# Patient Record
Sex: FEMALE | Race: OTHER | Employment: UNEMPLOYED | URBAN - METROPOLITAN AREA
[De-identification: names, ages, dates, MRNs, and addresses within clinical notes are randomized per-mention and may not be internally consistent; named-entity substitution may affect disease eponyms.]

---

## 2017-06-14 ENCOUNTER — APPOINTMENT (OUTPATIENT)
Dept: GENERAL RADIOLOGY | Age: 32
End: 2017-06-14
Attending: PHYSICIAN ASSISTANT
Payer: OTHER MISCELLANEOUS

## 2017-06-14 ENCOUNTER — HOSPITAL ENCOUNTER (EMERGENCY)
Age: 32
Discharge: HOME | End: 2017-06-14
Attending: EMERGENCY MEDICINE | Admitting: EMERGENCY MEDICINE
Payer: OTHER MISCELLANEOUS

## 2017-06-14 ENCOUNTER — APPOINTMENT (OUTPATIENT)
Dept: CT IMAGING | Age: 32
End: 2017-06-14
Attending: PHYSICIAN ASSISTANT
Payer: OTHER MISCELLANEOUS

## 2017-06-14 VITALS
RESPIRATION RATE: 16 BRPM | TEMPERATURE: 97.7 F | DIASTOLIC BLOOD PRESSURE: 71 MMHG | BODY MASS INDEX: 27.21 KG/M2 | SYSTOLIC BLOOD PRESSURE: 128 MMHG | HEIGHT: 59 IN | OXYGEN SATURATION: 100 % | WEIGHT: 135 LBS | HEART RATE: 68 BPM

## 2017-06-14 DIAGNOSIS — S09.90XA CHI (CLOSED HEAD INJURY), INITIAL ENCOUNTER: Primary | ICD-10-CM

## 2017-06-14 DIAGNOSIS — S39.012A BACK STRAIN, INITIAL ENCOUNTER: ICD-10-CM

## 2017-06-14 LAB
APPEARANCE UR: ABNORMAL
BACTERIA URNS QL MICRO: ABNORMAL /HPF
BILIRUB UR QL: NEGATIVE
COLOR UR: ABNORMAL
EPITH CASTS URNS QL MICRO: ABNORMAL /LPF
GLUCOSE UR STRIP.AUTO-MCNC: NEGATIVE MG/DL
HCG UR QL: NEGATIVE
HGB UR QL STRIP: ABNORMAL
HYALINE CASTS URNS QL MICRO: ABNORMAL /LPF (ref 0–5)
KETONES UR QL STRIP.AUTO: NEGATIVE MG/DL
LEUKOCYTE ESTERASE UR QL STRIP.AUTO: ABNORMAL
NITRITE UR QL STRIP.AUTO: NEGATIVE
PH UR STRIP: 7.5 [PH] (ref 5–8)
PROT UR STRIP-MCNC: NEGATIVE MG/DL
RBC #/AREA URNS HPF: ABNORMAL /HPF (ref 0–5)
SP GR UR REFRACTOMETRY: 1.01 (ref 1–1.03)
UROBILINOGEN UR QL STRIP.AUTO: 0.2 EU/DL (ref 0.2–1)
WBC URNS QL MICRO: ABNORMAL /HPF (ref 0–4)

## 2017-06-14 PROCEDURE — 81001 URINALYSIS AUTO W/SCOPE: CPT | Performed by: PHYSICIAN ASSISTANT

## 2017-06-14 PROCEDURE — 74011250637 HC RX REV CODE- 250/637: Performed by: PHYSICIAN ASSISTANT

## 2017-06-14 PROCEDURE — 72100 X-RAY EXAM L-S SPINE 2/3 VWS: CPT

## 2017-06-14 PROCEDURE — 81025 URINE PREGNANCY TEST: CPT

## 2017-06-14 PROCEDURE — 70450 CT HEAD/BRAIN W/O DYE: CPT

## 2017-06-14 PROCEDURE — 72070 X-RAY EXAM THORAC SPINE 2VWS: CPT

## 2017-06-14 PROCEDURE — 72050 X-RAY EXAM NECK SPINE 4/5VWS: CPT

## 2017-06-14 PROCEDURE — 99284 EMERGENCY DEPT VISIT MOD MDM: CPT

## 2017-06-14 RX ORDER — IBUPROFEN 600 MG/1
600 TABLET ORAL
Status: COMPLETED | OUTPATIENT
Start: 2017-06-14 | End: 2017-06-14

## 2017-06-14 RX ORDER — CYCLOBENZAPRINE HCL 10 MG
10 TABLET ORAL
Status: COMPLETED | OUTPATIENT
Start: 2017-06-14 | End: 2017-06-14

## 2017-06-14 RX ORDER — IBUPROFEN 200 MG
TABLET ORAL
COMMUNITY

## 2017-06-14 RX ORDER — CYCLOBENZAPRINE HCL 10 MG
10 TABLET ORAL
Qty: 10 TAB | Refills: 0 | Status: SHIPPED | OUTPATIENT
Start: 2017-06-14

## 2017-06-14 RX ADMIN — CYCLOBENZAPRINE HYDROCHLORIDE 10 MG: 10 TABLET, FILM COATED ORAL at 19:17

## 2017-06-14 RX ADMIN — IBUPROFEN 600 MG: 600 TABLET, FILM COATED ORAL at 19:17

## 2017-06-14 NOTE — LETTER
[address] 
[facility] EMERGENCY DEPT 
[address] [facility] [address]rielle Lynch [address] [id number] [phone] Work/School Note Date: 6/14/2017 To Whom It May concern: 
 
Coty Goldman was seen and treated today in the emergency room by the following provider(s): 
Attending Provider: Ankur Dietz MD 
Physician Assistant: Bonita You PA-C. Please excuse Ms. Shahnaz To from work 6/15/17 and 6/16/17. Coty Goldman may return to work on 6/17/17. Sincerely, Bonita You PA-C

## 2017-06-14 NOTE — ED NOTES
Bedside and Verbal shift change report given to [address] [place] [address] Rd (oncoming nurse) by Karen Matthews RN (offgoing nurse). Report included the following information SBAR, ED Summary and MAR.

## 2017-06-14 NOTE — ED TRIAGE NOTES
Was at work on Monday and a door fell, hitting her in the head. No LOC. Was seen at ER in [place] and discharge. Presents today with c/o lower back pain. No loss of b/b control. Ambulatory.

## 2017-06-15 NOTE — DISCHARGE INSTRUCTIONS
We hope that we have addressed all of your medical concerns. The examination and treatment you received in the Emergency Department were for an emergent problem and were not intended as complete care. It is important that you follow up with your healthcare provider(s) for ongoing care. If your symptoms worsen or do not improve as expected, and you are unable to reach your usual health care provider(s), you should return to the Emergency Department. Today's healthcare is undergoing tremendous change, and patient satisfaction surveys are one of the many tools to assess the quality of medical care. You may receive a survey from the [employer] regarding your experience in the Emergency Department. I hope that your experience has been completely positive, particularly the medical care that I provided. As such, please participate in the survey; anything less than excellent does not meet my expectations or intentions. [facility] and [address] [place] [address] participate in nationally recognized quality of care measures. If your blood pressure is greater than 120/80, as reported below, we urge that you seek medical care to address the potential of high blood pressure, commonly known as hypertension. Hypertension can be hereditary or can be caused by certain medical conditions, pain, stress, or \"white coat syndrome. \"       Please make an appointment with your health care provider(s) for follow up of your Emergency Department visit. VITALS:   Patient Vitals for the past 8 hrs:   Temp Pulse Resp BP SpO2   06/14/17 1820 97.7 °F (36.5 °C) 68 16 128/71 100 %          Thank you for allowing us to provide you with medical care today. We realize that you have many choices for your emergency care needs. Please choose us in the future for any continued health care needs.       Regards,           Lobito Dao PA-C    [employer]. Office: [phone]            Recent Results (from the past 24 hour(s))   HCG URINE, QL. - POC    Collection Time: 06/14/17  7:46 PM   Result Value Ref Range    Pregnancy test,urine (POC) NEGATIVE  NEG     URINALYSIS W/MICROSCOPIC    Collection Time: 06/14/17  8:26 PM   Result Value Ref Range    Color YELLOW/STRAW      Appearance CLOUDY (A) CLEAR      Specific gravity 1.015 1.003 - 1.030      pH (UA) 7.5 5.0 - 8.0      Protein NEGATIVE  NEG mg/dL    Glucose NEGATIVE  NEG mg/dL    Ketone NEGATIVE  NEG mg/dL    Bilirubin NEGATIVE  NEG      Blood SMALL (A) NEG      Urobilinogen 0.2 0.2 - 1.0 EU/dL    Nitrites NEGATIVE  NEG      Leukocyte Esterase TRACE (A) NEG      WBC 0-4 0 - 4 /hpf    RBC 0-5 0 - 5 /hpf    Epithelial cells FEW FEW /lpf    Bacteria 1+ (A) NEG /hpf    Hyaline cast 0-2 0 - 5 /lpf       Xr Spine Cerv 4 Or 5 V    Result Date: 6/14/2017  Cervical spine, 6 views. 6/14/2017 8:30 PM INDICATION: Neck Pain COMPARISON: None. FINDINGS: Frontal, lateral, swimmer's, open-mouth, and bilateral oblique radiographs of the cervical spine. Cervical alignment and vertebral body heights are normal. The prevertebral soft tissues are normal. The facets are normally indicated. The spinolaminar line is intact. No significant degenerative disease. IMPRESSION: Normal alignment and vertebral body heights. Xr Spine Thorac 2 V    Result Date: 6/14/2017  THORACIC SPINE, 2 VIEWS. 6/14/2017 8:30 PM INDICATION: Back Pain. COMPARISON: None. FINDINGS: Frontal and lateral radiographs of the thoracic spine. The cervicothoracic junction is obscured on lateral radiograph. Thoracic alignment and vertebral body heights are normal.     IMPRESSION: Normal thoracic alignment and vertebral body heights. Xr Spine Lumb 2 Or 3 V    Result Date: 6/14/2017  EXAM:  XR SPINE LUMB 2 OR 3 V INDICATION:   pain, fall COMPARISON: None. FINDINGS: AP, lateral and spot lateral views of the lumbar spine demonstrate normal alignment.   The vertebral body heights and disc spaces are well-preserved. There is no fracture, subluxation or other abnormality. IMPRESSION:  No acute abnormality     Ct Head Wo Cont    Result Date: 6/14/2017  HEAD CT WITHOUT CONTRAST: 6/14/2017 8:27 PM INDICATION: head injury, vomiting, pain. HISTORY (per electronic medical record): A door fell on her head at work 2 days ago. No loss of consciousness. Single vomiting episode. COMPARISON: None. PROCEDURE: Axial images of the head were obtained without contrast. Coronal and sagittal reformats were performed. CT dose reduction was achieved through use of a standardized protocol tailored for this examination and automatic exposure control for dose modulation. FINDINGS: The ventricles and sulci are appropriate in size and configuration for age. No loss of gray-white differentiation to suggest late acute or early subacute infarction. No mass effect or intracranial hemorrhage. IMPRESSION: Normal.             Distensión de la espalda: Instrucciones de cuidado - [ Back Strain: Care Instructions ]  Instrucciones de cuidado    La distensión de la espalda ocurre cuando usted estira demasiado, o fuerza, un músculo de la espalda. Usted puede lesionarse la espalda en un accidente o cuando hace ejercicio o levanta algo. La mayoría de los marlin de espalda mejoran con reposo y [place]. Usted puede cuidarse en el hogar para ayudar a que berry espalda sane. La atención de seguimiento es alvarez parte clave de berry tratamiento y seguridad. Asegúrese de hacer y acudir a todas las citas, y llame a berry médico si está teniendo problemas. También es alvarez buena idea saber los resultados de los exámenes y mantener alvarez lista de los medicamentos que shimon. ¿Cómo puede cuidarse en el hogar? · Trate de permanecer tan activo katie pueda, rohini deje de hacer o reduzca cualquier actividad que le produzca dolor.   · Colóquese hielo o alvarez compresa fría en el músculo adolorido de 10 a 20 minutos cada vez para detener la hinchazón. Trate de hacerlo cada 1 o 2 horas shaka 3 días (cuando esté despierto) o hasta que la hinchazón baje. Póngase un paño roper entre el hielo y la piel. · Después de 2 o 3 días, coloque alvarez almohadilla térmica ajustada a baja temperatura o un paño tibio sobre la espalda. Algunos médicos sugieren que se alterne entre tratamientos calientes y fríos. · [employer] analgésicos (medicamentos para el dolor) exactamente según las indicaciones. ¨ Si el médico le recetó un analgésico, tómelo según las indicaciones. ¨ Si no está tomando un analgésico recetado, pregúntele a berry médico si puede gucci leighann de [employer]. · Trate de dormir de lado con alvarez almohada entre las piernas. O, colóquese alvarez almohada debajo de las rodillas cuando se acueste [place] Estas medidas pueden aliviar el dolor en la parte baja de la espalda. · Fortino Shah a poco a berry nivel habitual de actividades. ¿Cuándo debe pedir ayuda? Llame al 911 en cualquier momento que considere que necesita atención de [place]. Por ejemplo, llame si:  · Es completamente incapaz de  alvarez pierna. Llame a ebrry médico ahora mismo o busque atención médica inmediata si:  · Tiene síntomas nuevos o peores en las piernas, el abdomen o las nalgas (glúteos). Los síntomas pueden incluir:  ¨ Entumecimiento u hormigueo. ¨ Debilidad. ¨ Dolor. · Pierde el control de la vejiga o del intestino. Preste especial atención a los cambios en berry renetta y asegúrese de comunicarse con berry médico si no mejora katie se esperaba. ¿Dónde puede encontrar más información en inglés? Ashleigh Green a http://marito-felice.info/. Miranda Casillas W560 en la búsqueda para aprender más acerca de \"Distensión de la espalda: Instrucciones de cuidado - [ Back Strain: Care Instructions ]. \"  Revisado: 23 vaughn, 2016  Versión del contenido: 11.2  © 8305-6110 [employer].  Las instrucciones de cuidado fueron adaptadas bajo licencia por [employer] (which disclaims liability or warranty for this information). Si usted tiene [place] afección médica o sobre estas instrucciones, siempre pregunte a berry profesional de renetta. [facility] [employer] niega toda garantía o responsabilidad por berry uso de esta información. Aprenda sobre alvarez lesión cerrada en la tonie - [ [employer]to [employer] About a Closed Head Injury ]  ¿Qué es alvarez lesión cerrada en la tonie? Alvarez lesión cerrada en la tonie ocurre cuando la tonie recibe un golpe dre. La intensa fuerza del golpe hace que el cerebro se sacuda dentro del cráneo. Kristen movimiento puede hacer que el cerebro se magulle, se hinche o se desgarre. A veces, los nervios o los vasos sanguíneos también se dañan. Lihue puede provocar sangrado dentro del cerebro o alrededor de él. Tess Mitten conmoción cerebral es un tipo de lesión cerrada en la tonie. ¿Cuáles son los síntomas? Si usted tiene alvarez conmoción leve, podría tener un ligero dolor de tonie o sentirse \"no del todo brendan\". Estos síntomas son comunes. Suelen desaparecer entre unos pocos días y 4 semanas después. Dannie, a veces, es posible que después de alvarez conmoción cerebral usted se sienta katie si no pudiera funcionar luque brendan katie antes de la lesión. Y tiene nuevos síntomas. Lihue se llama síndrome posconmocional. Usted puede:  · Tener alvarez mayor dificultad para resolver problemas, pensar, concentrarse o recordar. · [employer] de [place]. · Tener cambios en kimmie patrones de sueño, katie no poder dormir o dormir todo el tiempo. · Tener cambios de personalidad. · No tener interés en las actividades habituales. · Sentirse enojado o ansioso sin motivo noah. · Perder el sentido del gusto o del olfato. · Estar mareado, aturdido o con dificultades para mantener el equilibrio. Podría resultarle difícil estar de pie o caminar. ¿Cómo se trata alvarez lesión cerrada en la tonie?   Cualquier persona que pueda tener alvarez conmoción cerebral necesita rosa m a un médico. Algunas personas tienen que permanecer en el hospital para estar en observación. Otras pueden volver a berry hogar de manera coyne. Si usted vuelve a berry hogar, siga las instrucciones de berry médico. Él o linda le dirá si necesita que alguien lo vigile atentamente shaka las siguientes 24 horas o más [place]. El descanso es el mejor tratamiento. Duerma lo suficiente por la noche. Y trate de descansar shaka el día. · Evite las actividades que requieran un esfuerzo físico o mental. Estas incluyen las tareas domésticas y escolares, así katie el ejercicio. Y evite jugar videojuegos, enviar mensajes de texto o usar la computadora. Es posible que deba cambiar berry horario escolar o laboral para poder evitar estas actividades. · Pregúntele a berry médico cuándo va a poder conducir, montar en bicicleta u operar maquinaria. · Onarga un analgésico (medicamento para el dolor) de venta romeo, katie acetaminofén (Tylenol), ibuprofeno (Advil, Motrin) o naproxeno (Aleve). Sea zak con los medicamentos. Beth y siga todas las instrucciones de la [place]. · Consulte a berry médico antes de gucci cualquier otro medicamento para el dolor. · No curt alcohol ni consuma drogas ilegales. Pueden retrasar la sanación. También pueden aumentar el riesgo de sufrir alvarez segunda lesión en la tonie. La atención de seguimiento es alvarez parte clave de berry tratamiento y seguridad. Asegúrese de hacer y acudir a todas las citas, y llame a berry médico si está teniendo problemas. También es alvarez buena idea saber los resultados de kimmie exámenes y mantener alvarez lista de los medicamentos que shimon. ¿Dónde puede encontrar más información en inglés? Dorothea Roque a http://marito-felice.info/. Escriba E235 en la búsqueda para aprender más acerca de \"Aprenda sobre alvarez lesión cerrada en la Raciel Bharathi - [ Learning About a Closed Head Injury ]. \"  Revisado: 14 octubre, 2016  Versión del contenido: 11.2  © 7145-4720 [employer].  Las instrucciones de cuidado fueron adaptadas bajo licencia por [facility] Connections (which disclaims liability or warranty for this information). Si usted tiene [place] afección médica o sobre estas instrucciones, siempre pregunte a berry profesional de renetta. [facility]orporated niega toda garantía o responsabilidad por berry uso de esta información.

## 2021-07-21 ENCOUNTER — HOSPITAL ENCOUNTER (EMERGENCY)
Age: 36
Discharge: HOME | End: 2021-07-21
Attending: EMERGENCY MEDICINE

## 2021-07-21 ENCOUNTER — APPOINTMENT (OUTPATIENT)
Dept: ULTRASOUND IMAGING | Age: 36
End: 2021-07-21
Attending: EMERGENCY MEDICINE

## 2021-07-21 ENCOUNTER — APPOINTMENT (OUTPATIENT)
Dept: GENERAL RADIOLOGY | Age: 36
End: 2021-07-21
Attending: PHYSICIAN ASSISTANT

## 2021-07-21 VITALS
TEMPERATURE: 99 F | WEIGHT: 132.94 LBS | SYSTOLIC BLOOD PRESSURE: 108 MMHG | RESPIRATION RATE: 14 BRPM | BODY MASS INDEX: 26.85 KG/M2 | DIASTOLIC BLOOD PRESSURE: 62 MMHG | OXYGEN SATURATION: 100 % | HEART RATE: 81 BPM

## 2021-07-21 DIAGNOSIS — R11.2 NAUSEA AND VOMITING, INTRACTABILITY OF VOMITING NOT SPECIFIED, UNSPECIFIED VOMITING TYPE: Primary | ICD-10-CM

## 2021-07-21 LAB
ALBUMIN SERPL-MCNC: 3.4 G/DL (ref 3.5–5)
ALBUMIN/GLOB SERPL: 0.8 {RATIO} (ref 1.1–2.2)
ALP SERPL-CCNC: 53 U/L (ref 45–117)
ALT SERPL-CCNC: 33 U/L (ref 12–78)
ANION GAP SERPL CALC-SCNC: 7 MMOL/L (ref 5–15)
APPEARANCE UR: CLEAR
AST SERPL-CCNC: 32 U/L (ref 15–37)
BACTERIA URNS QL MICRO: ABNORMAL /HPF
BASOPHILS # BLD: 0 K/UL (ref 0–0.1)
BASOPHILS NFR BLD: 0 % (ref 0–1)
BILIRUB SERPL-MCNC: 0.4 MG/DL (ref 0.2–1)
BILIRUB UR QL CFM: NEGATIVE
BUN SERPL-MCNC: 9 MG/DL (ref 6–20)
BUN/CREAT SERPL: 16 (ref 12–20)
CALCIUM SERPL-MCNC: 8.1 MG/DL (ref 8.5–10.1)
CHLORIDE SERPL-SCNC: 102 MMOL/L (ref 97–108)
CO2 SERPL-SCNC: 28 MMOL/L (ref 21–32)
COLOR UR: ABNORMAL
CREAT SERPL-MCNC: 0.58 MG/DL (ref 0.55–1.02)
DIFFERENTIAL METHOD BLD: ABNORMAL
EOSINOPHIL # BLD: 0 K/UL (ref 0–0.4)
EOSINOPHIL NFR BLD: 0 % (ref 0–7)
EPITH CASTS URNS QL MICRO: ABNORMAL /LPF
ERYTHROCYTE [DISTWIDTH] IN BLOOD BY AUTOMATED COUNT: 13.7 % (ref 11.5–14.5)
GLOBULIN SER CALC-MCNC: 4.4 G/DL (ref 2–4)
GLUCOSE SERPL-MCNC: 90 MG/DL (ref 65–100)
GLUCOSE UR STRIP.AUTO-MCNC: NEGATIVE MG/DL
HCG UR QL: NEGATIVE
HCT VFR BLD AUTO: 38.2 % (ref 35–47)
HGB BLD-MCNC: 12.6 G/DL (ref 11.5–16)
HGB UR QL STRIP: NEGATIVE
IMM GRANULOCYTES # BLD AUTO: 0.1 K/UL (ref 0–0.04)
IMM GRANULOCYTES NFR BLD AUTO: 1 % (ref 0–0.5)
KETONES UR QL STRIP.AUTO: 15 MG/DL
LEUKOCYTE ESTERASE UR QL STRIP.AUTO: ABNORMAL
LIPASE SERPL-CCNC: 170 U/L (ref 73–393)
LYMPHOCYTES # BLD: 0.6 K/UL (ref 0.8–3.5)
LYMPHOCYTES NFR BLD: 10 % (ref 12–49)
MCH RBC QN AUTO: 28.4 PG (ref 26–34)
MCHC RBC AUTO-ENTMCNC: 33 G/DL (ref 30–36.5)
MCV RBC AUTO: 86.2 FL (ref 80–99)
MONOCYTES # BLD: 0.4 K/UL (ref 0–1)
MONOCYTES NFR BLD: 7 % (ref 5–13)
NEUTS SEG # BLD: 4.8 K/UL (ref 1.8–8)
NEUTS SEG NFR BLD: 82 % (ref 32–75)
NITRITE UR QL STRIP.AUTO: NEGATIVE
NRBC # BLD: 0 K/UL (ref 0–0.01)
NRBC BLD-RTO: 0 PER 100 WBC
PH UR STRIP: 6 [PH] (ref 5–8)
PLATELET # BLD AUTO: 170 K/UL (ref 150–400)
PMV BLD AUTO: 11.5 FL (ref 8.9–12.9)
POTASSIUM SERPL-SCNC: 4.1 MMOL/L (ref 3.5–5.1)
PROT SERPL-MCNC: 7.8 G/DL (ref 6.4–8.2)
PROT UR STRIP-MCNC: NEGATIVE MG/DL
RBC # BLD AUTO: 4.43 M/UL (ref 3.8–5.2)
RBC #/AREA URNS HPF: ABNORMAL /HPF (ref 0–5)
RBC MORPH BLD: ABNORMAL
SODIUM SERPL-SCNC: 137 MMOL/L (ref 136–145)
SP GR UR REFRACTOMETRY: 1.03 (ref 1–1.03)
UA: UC IF INDICATED,UAUC: ABNORMAL
UROBILINOGEN UR QL STRIP.AUTO: 1 EU/DL (ref 0.2–1)
WBC # BLD AUTO: 5.9 K/UL (ref 3.6–11)
WBC URNS QL MICRO: ABNORMAL /HPF (ref 0–4)

## 2021-07-21 PROCEDURE — 81001 URINALYSIS AUTO W/SCOPE: CPT

## 2021-07-21 PROCEDURE — 76705 ECHO EXAM OF ABDOMEN: CPT

## 2021-07-21 PROCEDURE — 96374 THER/PROPH/DIAG INJ IV PUSH: CPT

## 2021-07-21 PROCEDURE — 71045 X-RAY EXAM CHEST 1 VIEW: CPT

## 2021-07-21 PROCEDURE — 85025 COMPLETE CBC W/AUTO DIFF WBC: CPT

## 2021-07-21 PROCEDURE — 83690 ASSAY OF LIPASE: CPT

## 2021-07-21 PROCEDURE — 80053 COMPREHEN METABOLIC PANEL: CPT

## 2021-07-21 PROCEDURE — 81025 URINE PREGNANCY TEST: CPT

## 2021-07-21 PROCEDURE — 99284 EMERGENCY DEPT VISIT MOD MDM: CPT

## 2021-07-21 PROCEDURE — 96361 HYDRATE IV INFUSION ADD-ON: CPT

## 2021-07-21 PROCEDURE — 36415 COLL VENOUS BLD VENIPUNCTURE: CPT

## 2021-07-21 PROCEDURE — 74011250636 HC RX REV CODE- 250/636: Performed by: EMERGENCY MEDICINE

## 2021-07-21 RX ORDER — ONDANSETRON 2 MG/ML
4 INJECTION INTRAMUSCULAR; INTRAVENOUS
Status: COMPLETED | OUTPATIENT
Start: 2021-07-21 | End: 2021-07-21

## 2021-07-21 RX ORDER — ONDANSETRON 4 MG/1
4 TABLET, ORALLY DISINTEGRATING ORAL
Qty: 20 TABLET | Refills: 0 | Status: SHIPPED | OUTPATIENT
Start: 2021-07-21

## 2021-07-21 RX ADMIN — SODIUM CHLORIDE 1000 ML: 9 INJECTION, SOLUTION INTRAVENOUS at 17:06

## 2021-07-21 RX ADMIN — ONDANSETRON 4 MG: 2 INJECTION INTRAMUSCULAR; INTRAVENOUS at 17:08

## 2021-07-21 NOTE — ED NOTES
Sailaja Harper reviewed discharge instructions with the patient. The patient verbalized understanding. All questions and concerns were addressed. The patient declined a wheelchair and is discharged ambulatory in the care of family members with instructions and prescriptions in hand. Pt is alert and oriented x 4. Respirations are clear and unlabored.

## 2021-07-21 NOTE — ED PROVIDER NOTES
EMERGENCY DEPARTMENT HISTORY AND PHYSICAL EXAM      Date: 2021  Patient Name: Cj Ruelas    Please note that this dictation was completed with LOAG, the computer voice recognition software. Quite often unanticipated grammatical, syntax, homophones, and other interpretive errors are inadvertently transcribed by the computer software. Please disregard these errors. Please excuse any errors that have escaped final proofreading. History of Presenting Illness     Chief Complaint   Patient presents with    Vomiting     N+V since Saturday. also having chills, headaches and aches. Got COVID tested on Monday which was -       History Provided By: Patient     HPI: Cj Ruelas, 38 y.o. female, presenting the emergency department with nausea and vomiting that is been going on since Saturday. Unable to tolerate p.o. Reports diffuse abdominal cramping associated with nausea and vomiting. No change in stool. Reports subjective fever and chills at home. Was tested for Covid and was negative. Came in today due to ongoing symptoms. Denies chance of pregnancy. No urinary symptoms. No other exacerbating relieving factors or associated symptoms    PCP: None    No current facility-administered medications on file prior to encounter. Current Outpatient Medications on File Prior to Encounter   Medication Sig Dispense Refill    ibuprofen (MOTRIN IB) 200 mg tablet Take  by mouth. (Patient not taking: Reported on 2021)      cyclobenzaprine (FLEXERIL) 10 mg tablet Take 1 Tab by mouth three (3) times daily as needed for Muscle Spasm(s). (Patient not taking: Reported on 2021) 10 Tab 0       Past History     Past Medical History:  No past medical history on file. Past Surgical History:  Past Surgical History:   Procedure Laterality Date    HX  SECTION      x1       Family History:  No family history on file.     Social History:  Social History     Tobacco Use    Smoking status: Never Smoker Substance Use Topics    Alcohol use: No    Drug use: No       Allergies:  No Known Allergies      Review of Systems   Review of Systems   Constitutional: Negative for chills and fever. HENT: Negative for congestion and sore throat. Eyes: Negative for visual disturbance. Respiratory: Negative for cough and shortness of breath. Cardiovascular: Negative for chest pain and leg swelling. Gastrointestinal: Positive for abdominal pain. Negative for blood in stool, diarrhea and nausea. Endocrine: Negative for polyuria. Genitourinary: Positive for dysuria. Negative for flank pain, vaginal bleeding and vaginal discharge. Musculoskeletal: Negative for myalgias. Skin: Negative for rash. Allergic/Immunologic: Negative for immunocompromised state. Neurological: Negative for weakness and headaches. Psychiatric/Behavioral: Negative for confusion. Physical Exam   Physical Exam  Vitals and nursing note reviewed. Constitutional:       Appearance: She is well-developed. HENT:      Head: Normocephalic and atraumatic. Eyes:      General:         Right eye: No discharge. Left eye: No discharge. Conjunctiva/sclera: Conjunctivae normal.      Pupils: Pupils are equal, round, and reactive to light. Neck:      Trachea: No tracheal deviation. Cardiovascular:      Rate and Rhythm: Normal rate and regular rhythm. Heart sounds: Normal heart sounds. No murmur heard. Pulmonary:      Effort: Pulmonary effort is normal. No respiratory distress. Breath sounds: Normal breath sounds. No wheezing or rales. Abdominal:      General: Bowel sounds are normal.      Palpations: Abdomen is soft. Tenderness: There is no abdominal tenderness. There is no guarding or rebound. Musculoskeletal:         General: No tenderness or deformity. Normal range of motion. Cervical back: Normal range of motion and neck supple. Skin:     General: Skin is warm and dry.       Findings: No erythema or rash. Neurological:      Mental Status: She is alert and oriented to person, place, and time. Psychiatric:         Behavior: Behavior normal.         Diagnostic Study Results     Labs -     Recent Results (from the past 12 hour(s))   URINALYSIS W/ REFLEX CULTURE    Collection Time: 07/21/21  3:03 PM    Specimen: Miscellaneous sample; Urine    Urine specimen   Result Value Ref Range    Color DARK YELLOW      Appearance CLEAR CLEAR      Specific gravity 1.027 1.003 - 1.030      pH (UA) 6.0 5.0 - 8.0      Protein Negative NEG mg/dL    Glucose Negative NEG mg/dL    Ketone 15 (A) NEG mg/dL    Blood Negative NEG      Urobilinogen 1.0 0.2 - 1.0 EU/dL    Nitrites Negative NEG      Leukocyte Esterase MODERATE (A) NEG      WBC 5-10 0 - 4 /hpf    RBC 0-5 0 - 5 /hpf    Epithelial cells MODERATE (A) FEW /lpf    Bacteria 3+ (A) NEG /hpf    UA:UC IF INDICATED CULTURE NOT INDICATED BY UA RESULT CNI     BILIRUBIN, CONFIRM    Collection Time: 07/21/21  3:03 PM   Result Value Ref Range    Bilirubin UA, confirm Negative NEG     HCG URINE, QL. - POC    Collection Time: 07/21/21  3:06 PM   Result Value Ref Range    Pregnancy test,urine (POC) Negative NEG     CBC WITH AUTOMATED DIFF    Collection Time: 07/21/21  3:26 PM   Result Value Ref Range    WBC 5.9 3.6 - 11.0 K/uL    RBC 4.43 3.80 - 5.20 M/uL    HGB 12.6 11.5 - 16.0 g/dL    HCT 38.2 35.0 - 47.0 %    MCV 86.2 80.0 - 99.0 FL    MCH 28.4 26.0 - 34.0 PG    MCHC 33.0 30.0 - 36.5 g/dL    RDW 13.7 11.5 - 14.5 %    PLATELET 317 262 - 282 K/uL    MPV 11.5 8.9 - 12.9 FL    NRBC 0.0 0  WBC    ABSOLUTE NRBC 0.00 0.00 - 0.01 K/uL    NEUTROPHILS 82 (H) 32 - 75 %    LYMPHOCYTES 10 (L) 12 - 49 %    MONOCYTES 7 5 - 13 %    EOSINOPHILS 0 0 - 7 %    BASOPHILS 0 0 - 1 %    IMMATURE GRANULOCYTES 1 (H) 0.0 - 0.5 %    ABS. NEUTROPHILS 4.8 1.8 - 8.0 K/UL    ABS. LYMPHOCYTES 0.6 (L) 0.8 - 3.5 K/UL    ABS. MONOCYTES 0.4 0.0 - 1.0 K/UL    ABS. EOSINOPHILS 0.0 0.0 - 0.4 K/UL    ABS. BASOPHILS 0.0 0.0 - 0.1 K/UL    ABS. IMM. GRANS. 0.1 (H) 0.00 - 0.04 K/UL    DF SMEAR SCANNED      RBC COMMENTS NORMOCYTIC, NORMOCHROMIC     METABOLIC PANEL, COMPREHENSIVE    Collection Time: 07/21/21  3:26 PM   Result Value Ref Range    Sodium 137 136 - 145 mmol/L    Potassium 4.1 3.5 - 5.1 mmol/L    Chloride 102 97 - 108 mmol/L    CO2 28 21 - 32 mmol/L    Anion gap 7 5 - 15 mmol/L    Glucose 90 65 - 100 mg/dL    BUN 9 6 - 20 MG/DL    Creatinine 0.58 0.55 - 1.02 MG/DL    BUN/Creatinine ratio 16 12 - 20      GFR est AA >60 >60 ml/min/1.73m2    GFR est non-AA >60 >60 ml/min/1.73m2    Calcium 8.1 (L) 8.5 - 10.1 MG/DL    Bilirubin, total 0.4 0.2 - 1.0 MG/DL    ALT (SGPT) 33 12 - 78 U/L    AST (SGOT) 32 15 - 37 U/L    Alk. phosphatase 53 45 - 117 U/L    Protein, total 7.8 6.4 - 8.2 g/dL    Albumin 3.4 (L) 3.5 - 5.0 g/dL    Globulin 4.4 (H) 2.0 - 4.0 g/dL    A-G Ratio 0.8 (L) 1.1 - 2.2     LIPASE    Collection Time: 07/21/21  3:26 PM   Result Value Ref Range    Lipase 170 73 - 393 U/L       Radiologic Studies -   XR CHEST PORT   Final Result   No evidence of acute cardiopulmonary process. US ABD LTD   Final Result   Normal right upper quadrant ultrasound. CT Results  (Last 48 hours)    None        CXR Results  (Last 48 hours)               07/21/21 1801  XR CHEST PORT Final result    Impression:  No evidence of acute cardiopulmonary process. Narrative:  INDICATION: Chest pain       FINDINGS: AP portable imaging of the chest performed at 5:44 PM demonstrates a   normal cardiomediastinal silhouette. The lungs are clear bilaterally. No   significant osseous abnormalities are seen. Medical Decision Making   I am the first provider for this patient. I reviewed the vital signs, available nursing notes, past medical history, past surgical history, family history and social history. Vital Signs-Reviewed the patient's vital signs.   Patient Vitals for the past 12 hrs:   Temp Pulse Resp BP SpO2   07/21/21 1441 99 °F (37.2 °C) 81 14 108/62 100 %           Records Reviewed:   Nursing notes, Prior visits     Provider Notes (Medical Decision Making):   Likely viral syndrome, but could be cholecystitis, pancreatitis. Will check labs, provide antiemetics, fluids. Patient also requesting a chest x-ray. No significant respiratory symptoms at this time. Disposition pending labs, imaging, symptomatic improvement    ED Course:   Initial assessment performed. The patients presenting problems have been discussed, and they are in agreement with the care plan formulated and outlined with them. I have encouraged them to ask questions as they arise throughout their visit. Critical Care Time:   none    Disposition:    DISCHARGE NOTE  Patients results have been reviewed with them. Patient and/or family have verbally conveyed their understanding and agreement of the patient's signs, symptoms, diagnosis, treatment and prognosis and additionally agree to follow up as recommended or return to the Emergency Room should their condition change or have any new concerns prior to their follow-up appointment. Patient verbally agrees with the care-plan and verbally conveys that all of their questions have been answered. Discharge instructions have also been provided to the patient with some educational information regarding their diagnosis as well a list of reasons why they would want to return to the ER prior to their follow-up appointment should their condition change. PLAN:  1.    Discharge Medication List as of 7/21/2021  6:45 PM      START taking these medications    Details   ondansetron (Zofran ODT) 4 mg disintegrating tablet 1 Tablet by SubLINGual route every eight (8) hours as needed for Nausea or Vomiting., Normal, Disp-20 Tablet, R-0         CONTINUE these medications which have NOT CHANGED    Details   ibuprofen (MOTRIN IB) 200 mg tablet Take  by mouth., Historical Med cyclobenzaprine (FLEXERIL) 10 mg tablet Take 1 Tab by mouth three (3) times daily as needed for Muscle Spasm(s). , Print, Disp-10 Tab, R-0           2. Follow-up Information     Follow up With Specialties Details Why Contact Info    [facility] EMERGENCY DEPT Emergency Medicine  If symptoms worsen [address] [place] [address]  [phone]          Return to ED if worse     Diagnosis     Clinical Impression:   1. Nausea and vomiting, intractability of vomiting not specified, unspecified vomiting type        Attestations:   This note was completed by Zach Gill DO

## 2023-04-02 ENCOUNTER — APPOINTMENT (OUTPATIENT)
Dept: ULTRASOUND IMAGING | Age: 38
End: 2023-04-02
Attending: PHYSICIAN ASSISTANT

## 2023-04-02 ENCOUNTER — HOSPITAL ENCOUNTER (EMERGENCY)
Age: 38
Discharge: HOME | End: 2023-04-02
Attending: EMERGENCY MEDICINE

## 2023-04-02 DIAGNOSIS — O20.9 VAGINAL BLEEDING IN PREGNANCY, FIRST TRIMESTER: Primary | ICD-10-CM

## 2023-04-02 LAB
ABO + RH BLD: NORMAL
APPEARANCE UR: CLEAR
BACTERIA URNS QL MICRO: NEGATIVE /HPF
BILIRUB UR QL: NEGATIVE
BLOOD BANK CMNT PATIENT-IMP: NORMAL
COLOR UR: ABNORMAL
EPITH CASTS URNS QL MICRO: ABNORMAL /LPF
GLUCOSE UR STRIP.AUTO-MCNC: NEGATIVE MG/DL
HCG SERPL-ACNC: 559 MIU/ML (ref 0–6)
HCG UR QL: NEGATIVE
HCG UR QL: POSITIVE
HGB UR QL STRIP: ABNORMAL
KETONES UR QL STRIP.AUTO: NEGATIVE MG/DL
LEUKOCYTE ESTERASE UR QL STRIP.AUTO: NEGATIVE
NITRITE UR QL STRIP.AUTO: NEGATIVE
PH UR STRIP: 6 (ref 5–8)
PROT UR STRIP-MCNC: NEGATIVE MG/DL
RBC #/AREA URNS HPF: ABNORMAL /HPF (ref 0–5)
SP GR UR REFRACTOMETRY: 1.01
UA: UC IF INDICATED,UAUC: ABNORMAL
UROBILINOGEN UR QL STRIP.AUTO: 0.2 EU/DL (ref 0.2–1)
WBC URNS QL MICRO: ABNORMAL /HPF (ref 0–4)

## 2023-04-02 PROCEDURE — 84702 CHORIONIC GONADOTROPIN TEST: CPT

## 2023-04-02 PROCEDURE — 86900 BLOOD TYPING SEROLOGIC ABO: CPT

## 2023-04-02 PROCEDURE — 99284 EMERGENCY DEPT VISIT MOD MDM: CPT

## 2023-04-02 PROCEDURE — 81001 URINALYSIS AUTO W/SCOPE: CPT

## 2023-04-02 PROCEDURE — 76817 TRANSVAGINAL US OBSTETRIC: CPT

## 2023-04-02 PROCEDURE — 36415 COLL VENOUS BLD VENIPUNCTURE: CPT

## 2023-04-02 PROCEDURE — 81025 URINE PREGNANCY TEST: CPT

## 2023-04-02 NOTE — Clinical Note
Morelia Rashmi was seen and treated in our emergency department on 4/2/2023. Please excuse her from work for 3 days.           Marcia Edge

## 2023-04-02 NOTE — DISCHARGE INSTRUCTIONS
US UTS TRANSVAGINAL OB    Result Date: 2023  1. No intrauterine pregnancy demonstrated. 2. Hypoechoic and anechoic area within the endocervical canal measuring up to 8 mm. Diagnostic considerations include spontaneous  in progress in cervical ectopic pregnancy. No adnexal mass demonstrated.         Recent Results (from the past 12 hour(s))   BETA HCG, QT    Collection Time: 23  6:07 PM   Result Value Ref Range    Beta HCG,  (H) 0 - 6 MIU/ML

## 2023-04-02 NOTE — ED PROVIDER NOTES
[facility]  EMERGENCY DEPARTMENT ENCOUNTER       Pt Name: Jae Zambrano  MRN: [id number]  [place] 1985  Date of evaluation: 2023  Provider: LYNN Sadler   PCP: None  Note Started: 5:22 PM 23     CHIEF COMPLAINT       Chief Complaint   Patient presents with    Pregnancy Problem     PT. States she had a positive pregnancy test on Friday. Her last menstrual cycle was 3/16, she is 2 weeks pregnant. Pt. States on Friday she noticed bleeding, states she noticed a medium amount of blood when she urinated. Did not notice any blood on Saturday, states today she noticed blood clots coming out, states they were bright red. This is her 6th pregnancy, she has 5 living children. HISTORY OF PRESENT ILLNESS: 1 or more elements      History From: Patient and Patient's   HPI Limitations : Language Barrier (patient Macedonian speaking but  speaks [place] and they decline interpretor)     Jae Zambrano is a 42 y.o. female who presents with vaginal bleeding. The patient thought she was pregnant about 2 weeks ago but had what she thought was a menstrual cycle on 3/16/23. She then had back pain and some soreness to her breasts so she took a pregnancy test 2 days ago and it was positive. She was having some dark brown vaginal bleeding but then began having slightly heavier bleeding today with small clots, which prompted her visit to the ED. She has had intermittent pain radiating from her lower back to her pelvis, which has been alternating between sides. She denies fevers, vomiting, dysuria. She is , no established OB group. Nursing Notes were all reviewed and agreed with or any disagreements were addressed in the HPI. REVIEW OF SYSTEMS      Review of Systems     Positives and Pertinent negatives as per HPI. PAST HISTORY     Past Medical History:  No past medical history on file.     Past Surgical History:  Past Surgical History:   Procedure Laterality Date    HX  SECTION      x1       Family History:  No family history on file. Social History:  Social History     Tobacco Use    Smoking status: Never   Substance Use Topics    Alcohol use: No    Drug use: No       Allergies:  No Known Allergies    CURRENT MEDICATIONS      Discharge Medication List as of 4/2/2023  8:00 PM        CONTINUE these medications which have NOT CHANGED    Details   ondansetron (Zofran ODT) 4 mg disintegrating tablet 1 Tablet by SubLINGual route every eight (8) hours as needed for Nausea or Vomiting., Normal, Disp-20 Tablet, R-0      ibuprofen (MOTRIN IB) 200 mg tablet Take  by mouth., Historical Med      cyclobenzaprine (FLEXERIL) 10 mg tablet Take 1 Tab by mouth three (3) times daily as needed for Muscle Spasm(s). , Print, Disp-10 Tab, R-0             PHYSICAL EXAM      ED Triage Vitals [04/02/23 2558]   ED Encounter Vitals Group      /79      Pulse (Heart Rate) 81      Resp Rate 16      Temp 98.4 °F (36.9 °C)      Temp src       O2 Sat (%) 99 %      Weight 141 lb 1.5 oz      Height 4' 11\"        Physical Exam  Vitals and nursing note reviewed. Constitutional:       General: She is not in acute distress. Abdominal:      Comments: Abdomen is soft. No tenderness to palpation. No CVA tenderness to percussion. Genitourinary:     Comments: Deferred. Neurological:      Mental Status: She is alert.         DIAGNOSTIC RESULTS   LABS:     Recent Results (from the past 12 hour(s))   HCG URINE, QL. - POC    Collection Time: 04/02/23  5:08 PM   Result Value Ref Range    Pregnancy test,urine (POC) Negative NEG     HCG URINE, QL. - POC    Collection Time: 04/02/23  5:11 PM   Result Value Ref Range    Pregnancy test,urine (POC) Positive (A) NEG     URINALYSIS W/ REFLEX CULTURE    Collection Time: 04/02/23  5:12 PM    Specimen: Urine   Result Value Ref Range    Color YELLOW/STRAW      Appearance CLEAR CLEAR      Specific gravity 1.006      pH (UA) 6.0 5.0 - 8.0      Protein Negative NEG mg/dL    Glucose Negative NEG mg/dL    Ketone Negative NEG mg/dL    Bilirubin Negative NEG      Blood MODERATE (A) NEG      Urobilinogen 0.2 0.2 - 1.0 EU/dL    Nitrites Negative NEG      Leukocyte Esterase Negative NEG      WBC 0-4 0 - 4 /hpf    RBC 0-5 0 - 5 /hpf    Epithelial cells FEW FEW /lpf    Bacteria Negative NEG /hpf    UA:UC IF INDICATED CULTURE NOT INDICATED BY UA RESULT CNI     BETA HCG, QT    Collection Time: 04/02/23  6:07 PM   Result Value Ref Range    Beta HCG,  (H) 0 - 6 MIU/ML   BLOOD TYPE, (ABO+RH)    Collection Time: 04/02/23  6:48 PM   Result Value Ref Range    ABO/Rh(D) A POSITIVE     Comment SAMPLE NOT USABLE FOR CROSSMATCH         EKG: When ordered, EKG's are interpreted by the Emergency Department Physician in the absence of a cardiologist.  Please see their note for interpretation of EKG. RADIOLOGY:  Non-plain film images such as CT, Ultrasound and MRI are read by the radiologist. Plain radiographic images are visualized and preliminarily interpreted by the ED Provider with the below findings:          Interpretation per the Radiologist below, if available at the time of this note:     US UTS TRANSVAGINAL OB    Result Date: 4/2/2023  INDICATION: first trimester pregnancy, bleeding COMPARISON: None EXAM: Real-time transvaginal ultrasound pelvis. FINDINGS: The uterus measures 7.8 x 4.0 x 4.7 cm in size with an endometrial stripe thickness is 7 mm. No intrauterine pregnancy is demonstrated. There is no uterine mass. The endocervical canal shows a small area of hypoechoic and anechoic endocervical canal material. This finding measures up to 8 x 5 mm in size. Doppler flow is demonstrated in the ovaries bilaterally with the right ovary measuring 2.5 x 2.3 x 1.8 cm in size in the left ovary measuring 2.7 x 2.6 x 1.2 cm in size. No adnexal mass is demonstrated. No free pelvic fluid is shown. 1. No intrauterine pregnancy demonstrated.  2. Hypoechoic and anechoic area within the endocervical canal measuring up to 8 mm. Diagnostic considerations include spontaneous  in progress in cervical ectopic pregnancy. No adnexal mass demonstrated. PROCEDURES   Unless otherwise noted below, none  Procedures     CRITICAL CARE TIME       EMERGENCY DEPARTMENT COURSE and DIFFERENTIAL DIAGNOSIS/MDM   Vitals:    Vitals:    23 1628   BP: 121/79   Pulse: 81   Resp: 16   Temp: 98.4 °F (36.9 °C)   SpO2: 99%   Weight: 64 kg (141 lb 1.5 oz)   Height: 4' 11\" (1.499 m)        Patient was given the following medications:  Medications - No data to display    CONSULTS: (Who and What was discussed)  None    Chronic Conditions: None    Social Determinants affecting Dx or Tx: None    Records Reviewed (source and summary of external records): Prior medical records    CC/HPI Summary, DDx, ED Course, and Reassessment: This is a 42year old female who presents with vaginal bleeding and pelvic pain in the setting of early pregnancy. Differential diagnosis includes threatened AB, incomplete AB, missed AB, ectopic pregnancy, UTI. The patient is afebrile and in no acute distress. She has no abdominal tenderness on exam. Pelvic exam was not performed, but no concern for active hemorrhage based on her description of bleeding. UA shows no evidence of UTI. HCG is 559. Transvaginal US shows no evidence of IUP. There is a small cystic structure seen in the endocervical canal that may represent miscarriage in process vs cervical ectopic pregnancy. I think this more likely represents miscarriage in process given increase in vaginal bleeding. Discussed ultrasound results with the patient and her  and discussed she will need close follow up with OB for a repeat HCG and reexamination. Discussed close return precautions, including heavy bleeding, severe pain, syncope. Disposition Considerations (Tests not done, Shared Decision Making, Pt Expectation of Test or Tx.):      FINAL IMPRESSION     1.  Vaginal bleeding in pregnancy, first trimester          DISPOSITION/PLAN   Discharged    Discharge Note: The patient is stable for discharge home. The signs, symptoms, diagnosis, and discharge instructions have been discussed, understanding conveyed, and agreed upon. The patient is to follow up as recommended or return to ER should their symptoms worsen. PATIENT REFERRED TO:  Follow-up Information       Follow up With Specialties Details Why [address]  Schedule an appointment as soon as possible for a visit in 3 days for a recheck [address] [address]  [phone]    [facility] EMERGENCY DEPT Emergency Medicine Go to  bleeding through a pad every 30 minutes, severe pain, passing out [address] [place] [address]  [phone]              DISCHARGE MEDICATIONS:  Discharge Medication List as of 4/2/2023  8:00 PM            DISCONTINUED MEDICATIONS:  Discharge Medication List as of 4/2/2023  8:00 PM          ED Attending Involvement : I have seen and evaluated the patient. My supervision physician was available for consultation. I am the Primary Clinician of Record. LYNN Raphael (electronically signed)    (Please note that parts of this dictation were completed with voice recognition software. Quite often unanticipated grammatical, syntax, homophones, and other interpretive errors are inadvertently transcribed by the computer software. Please disregards these errors.  Please excuse any errors that have escaped final proofreading.)

## 2023-04-02 NOTE — Clinical Note
Lauren Malloy was seen and treated in our emergency department on 4/2/2023. Please excuse her from work for 3 days.           Marcia Padilla

## 2024-05-28 ENCOUNTER — OFFICE VISIT (OUTPATIENT)
Age: 39
End: 2024-05-28

## 2024-05-28 ENCOUNTER — HOSPITAL ENCOUNTER (OUTPATIENT)
Facility: HOSPITAL | Age: 39
Discharge: HOME | End: 2024-05-31

## 2024-05-28 VITALS — BODY MASS INDEX: 27.87 KG/M2 | WEIGHT: 138 LBS

## 2024-05-28 DIAGNOSIS — Z01.419 ENCOUNTER FOR WELL WOMAN EXAM: Primary | ICD-10-CM

## 2024-05-28 DIAGNOSIS — Z12.31 VISIT FOR SCREENING MAMMOGRAM: ICD-10-CM

## 2024-05-28 DIAGNOSIS — N60.19 FIBROCYSTIC BREAST CHANGES, UNSPECIFIED LATERALITY: ICD-10-CM

## 2024-05-28 PROCEDURE — 77063 BREAST TOMOSYNTHESIS BI: CPT

## 2024-05-28 NOTE — PROGRESS NOTES
Assessment/Plan:    Remedios was seen today for ewl.    Diagnoses and all orders for this visit:    Encounter for well woman exam    Fibrocystic breast changes, unspecified laterality    3D ordered  Discussed that during  first time mammograms, there is sometimes a call back for additional imaging     No follow-up provider specified.    Kalee Herman PA-C  Remedios Martines expressed understanding of this plan. An AVS was printed and given to the patient.      ----------------------------------------------------------------------    Chief Complaint   Patient presents with    EWL       History of Present Illness:  EWL annual well woman exam   4  and 3rd baby delivered via c/s  Had BTL  Having normal monthly periods.   No risk for DV  Had pap  with HD  No breast concerns or complaints. Breast fed all her children       No past medical history on file.    No current outpatient medications on file.     No current facility-administered medications for this visit.       No Known Allergies    Social History     Tobacco Use    Smoking status: Never   Substance Use Topics    Alcohol use: No    Drug use: No       No family history on file.    Physical Exam:     LMP 05/10/2024     A&Ox3  WDWN NAD  Respirations normal and non labored  Breast exam- arvind neg for dominant mass, skin color changes, dimpling or retractions. Left upper breast with rubbery, cystic area that is slightly tender

## 2025-03-25 NOTE — PROGRESS NOTES
EVERY WOMANS LIFE HISTORY QUESTIONNAIRE       No Yes Comments   Has a doctor ever seen or felt anything wrong with your breast? [x]                                  []                                     Have you ever had a breast biopsy? [x]                                  []                                          When and where was last mammogram performed?  [facility]    Have you ever been told that there was a problem on your mammogram?   No Yes Comments   [x]                                  []                                       Do you have breast implants?   No Yes Comments   [x]                                  []                                       When was your last Pap test performed? 1/2024 [facility] dept. Pt declined ewl pelvic.    Have you ever had an abnormal Pap test?   No Yes Comments   [x]                                  []                                       Have you had a hysterectomy?   No Yes Comments (why)   [x]                                  []                                       Have you been through menopause?   No Yes Date of LMP   [x]                                  []                                  5/10/2024     Did your mother take ERNESTO?  No Yes Unknown   []                                  []                                  x     Do you have a history of HIV exposure?  No Yes    [x]                                  []                                       Have you ever been diagnosed with any type of Cancer   No Yes Comments (type,when,where,type of treatment   [x]                                  []                                          Has a family member been diagnosed with breast or ovarian cancer?   No Yes Comments (which family members, and type   [x]                                  []                                       Are you taking hormone replacement therapy (HRT)     No Yes Comments   [x]                                  []                          ,DirectAddress_Unknown,DirectAddress_Unknown

## 2025-06-09 ENCOUNTER — TRANSCRIBE ORDERS (OUTPATIENT)
Facility: HOSPITAL | Age: 40
End: 2025-06-09

## 2025-06-09 DIAGNOSIS — Z12.31 VISIT FOR SCREENING MAMMOGRAM: Primary | ICD-10-CM

## 2025-08-25 ENCOUNTER — HOSPITAL ENCOUNTER (OUTPATIENT)
Facility: HOSPITAL | Age: 40
Discharge: HOME | End: 2025-08-28

## 2025-08-25 DIAGNOSIS — Z12.31 VISIT FOR SCREENING MAMMOGRAM: ICD-10-CM

## 2025-08-25 PROCEDURE — 77063 BREAST TOMOSYNTHESIS BI: CPT
